# Patient Record
Sex: MALE | Race: WHITE | Employment: FULL TIME | URBAN - METROPOLITAN AREA
[De-identification: names, ages, dates, MRNs, and addresses within clinical notes are randomized per-mention and may not be internally consistent; named-entity substitution may affect disease eponyms.]

---

## 2019-01-24 ENCOUNTER — NURSE TRIAGE (OUTPATIENT)
Dept: NURSING | Facility: CLINIC | Age: 34
End: 2019-01-24

## 2019-01-24 ENCOUNTER — OFFICE VISIT (OUTPATIENT)
Dept: FAMILY MEDICINE | Facility: CLINIC | Age: 34
End: 2019-01-24

## 2019-01-24 ENCOUNTER — ANCILLARY PROCEDURE (OUTPATIENT)
Dept: GENERAL RADIOLOGY | Facility: CLINIC | Age: 34
End: 2019-01-24

## 2019-01-24 VITALS
OXYGEN SATURATION: 96 % | RESPIRATION RATE: 16 BRPM | WEIGHT: 269 LBS | SYSTOLIC BLOOD PRESSURE: 129 MMHG | DIASTOLIC BLOOD PRESSURE: 76 MMHG | TEMPERATURE: 99 F | HEART RATE: 96 BPM

## 2019-01-24 DIAGNOSIS — R10.32 ABDOMINAL PAIN, LEFT LOWER QUADRANT: Primary | ICD-10-CM

## 2019-01-24 DIAGNOSIS — K59.00 CONSTIPATION, UNSPECIFIED CONSTIPATION TYPE: ICD-10-CM

## 2019-01-24 LAB
ALBUMIN UR-MCNC: NEGATIVE MG/DL
APPEARANCE UR: CLEAR
BACTERIA #/AREA URNS HPF: ABNORMAL /HPF
BASOPHILS # BLD AUTO: 0 10E9/L (ref 0–0.2)
BASOPHILS NFR BLD AUTO: 0.3 %
BILIRUB UR QL STRIP: NEGATIVE
COLOR UR AUTO: YELLOW
DIFFERENTIAL METHOD BLD: NORMAL
EOSINOPHIL # BLD AUTO: 0.1 10E9/L (ref 0–0.7)
EOSINOPHIL NFR BLD AUTO: 1.2 %
ERYTHROCYTE [DISTWIDTH] IN BLOOD BY AUTOMATED COUNT: 12.2 % (ref 10–15)
GLUCOSE UR STRIP-MCNC: NEGATIVE MG/DL
HCT VFR BLD AUTO: 44.5 % (ref 40–53)
HGB BLD-MCNC: 15.5 G/DL (ref 13.3–17.7)
HGB UR QL STRIP: ABNORMAL
HYALINE CASTS #/AREA URNS LPF: ABNORMAL /LPF
KETONES UR STRIP-MCNC: NEGATIVE MG/DL
LEUKOCYTE ESTERASE UR QL STRIP: NEGATIVE
LYMPHOCYTES # BLD AUTO: 2 10E9/L (ref 0.8–5.3)
LYMPHOCYTES NFR BLD AUTO: 21.8 %
MCH RBC QN AUTO: 29.6 PG (ref 26.5–33)
MCHC RBC AUTO-ENTMCNC: 34.8 G/DL (ref 31.5–36.5)
MCV RBC AUTO: 85 FL (ref 78–100)
MONOCYTES # BLD AUTO: 0.7 10E9/L (ref 0–1.3)
MONOCYTES NFR BLD AUTO: 7.2 %
MUCOUS THREADS #/AREA URNS LPF: PRESENT /LPF
NEUTROPHILS # BLD AUTO: 6.3 10E9/L (ref 1.6–8.3)
NEUTROPHILS NFR BLD AUTO: 69.5 %
NITRATE UR QL: NEGATIVE
NON-SQ EPI CELLS #/AREA URNS LPF: ABNORMAL /LPF
PH UR STRIP: 6 PH (ref 5–7)
PLATELET # BLD AUTO: 284 10E9/L (ref 150–450)
RBC # BLD AUTO: 5.24 10E12/L (ref 4.4–5.9)
RBC #/AREA URNS AUTO: ABNORMAL /HPF
SOURCE: ABNORMAL
SP GR UR STRIP: >1.03 (ref 1–1.03)
UROBILINOGEN UR STRIP-ACNC: 0.2 EU/DL (ref 0.2–1)
WBC # BLD AUTO: 9.1 10E9/L (ref 4–11)
WBC #/AREA URNS AUTO: ABNORMAL /HPF

## 2019-01-24 PROCEDURE — 99203 OFFICE O/P NEW LOW 30 MIN: CPT | Performed by: FAMILY MEDICINE

## 2019-01-24 PROCEDURE — 85025 COMPLETE CBC W/AUTO DIFF WBC: CPT | Performed by: FAMILY MEDICINE

## 2019-01-24 PROCEDURE — 74019 RADEX ABDOMEN 2 VIEWS: CPT

## 2019-01-24 PROCEDURE — 36415 COLL VENOUS BLD VENIPUNCTURE: CPT | Performed by: FAMILY MEDICINE

## 2019-01-24 PROCEDURE — 87086 URINE CULTURE/COLONY COUNT: CPT | Performed by: FAMILY MEDICINE

## 2019-01-24 PROCEDURE — 81001 URINALYSIS AUTO W/SCOPE: CPT | Performed by: FAMILY MEDICINE

## 2019-01-24 SDOH — HEALTH STABILITY: MENTAL HEALTH: HOW OFTEN DO YOU HAVE A DRINK CONTAINING ALCOHOL?: NEVER

## 2019-01-24 ASSESSMENT — PAIN SCALES - GENERAL: PAINLEVEL: MILD PAIN (2)

## 2019-01-24 NOTE — PROGRESS NOTES
"  SUBJECTIVE:   Jarad Bridges is a 33 year old male who presents to clinic today for the following health issues:      Patient presents with:  Abdominal Pain: pt c/o abdominal pain near belly botton x 3 days, denies injuury, nausea orvomiting but states he may be constipated.      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   Some occasional spasms colicky pain  Some worse with moving certain way    Severity: mild   description: above   aggravating symptoms: above   relieving symptoms: not sure just a little better   nausea and vomiting: no  Diarrhea none - does have slight constipation nothing tried.   treatments tried: none    urinary symptoms:  none   flank pain:  none  fever or chills:  none  weight loss or constitutional symptoms: none  melena, hematochezia, or hematemesis: none  No testicular pain -   Problem list, Medication list, Allergies, and Medical/Social/Surgical histories reviewed in [facility] and updated as appropriate.      ROS:  General: negative for fever  Resp: negative for chest pain   CV: negative for chest pain  ABD: as above  : negative for dysuria  Neurologic:negative for Headache  Psych: denies any thoughts of harming self or others.     Constitutional, HEENT, cardiovascular, pulmonary, GI, , musculoskeletal, neuro, skin, endocrine and psych systems are negative, except as otherwise noted.    OBJECTIVE:  /76   Pulse 96   Temp 99  F (37.2  C) (Oral)   Resp 16   Wt 122 kg (269 lb)   SpO2 96%  "   General:   awake, alert, and cooperative.  NAD.   Head: Normocephalic, atraumatic.  Eyes: Conjunctiva clear, non icteric. WILMAN  Heart: Regular rate and rhythm. No murmur.  Lungs: Chest is clear; no wheezes or rales.  ABD: soft, mild left lower quadrant  tenderness to palpation , no rigidity, guarding or rebound , bowel sounds intact  RECTAL: declined/deferred  : bilaterally descended testes nontender. No swelling.  Normal appearing external genitalia. No hernias  No rashes no penile discharge.   Rectal exam: good rectal tone. Brown stool on examining finger. No hemorrhoids. No skin tags. No rectal masses.  Right testicle mass palpable  Neuro: Alert and oriented - normal speech.       Diagnostic Test Results:  Results for orders placed or performed in visit on 01/24/19 (from the past 24 hour(s))   CBC with platelets differential   Result Value Ref Range    WBC 9.1 4.0 - 11.0 10e9/L    RBC Count 5.24 4.4 - 5.9 10e12/L    Hemoglobin 15.5 13.3 - 17.7 g/dL    Hematocrit 44.5 40.0 - 53.0 %    MCV 85 78 - 100 fl    MCH 29.6 26.5 - 33.0 pg    MCHC 34.8 31.5 - 36.5 g/dL    RDW 12.2 10.0 - 15.0 %    Platelet Count 284 150 - 450 10e9/L    % Neutrophils 69.5 %    % Lymphocytes 21.8 %    % Monocytes 7.2 %    % Eosinophils 1.2 %    % Basophils 0.3 %    Absolute Neutrophil 6.3 1.6 - 8.3 10e9/L    Absolute Lymphocytes 2.0 0.8 - 5.3 10e9/L    Absolute Monocytes 0.7 0.0 - 1.3 10e9/L    Absolute Eosinophils 0.1 0.0 - 0.7 10e9/L    Absolute Basophils 0.0 0.0 - 0.2 10e9/L    Diff Method Automated Method    UA with Microscopic reflex to Culture   Result Value Ref Range    Color Urine Yellow     Appearance Urine Clear     Glucose Urine Negative NEG^Negative mg/dL    Bilirubin Urine Negative NEG^Negative    Ketones Urine Negative NEG^Negative mg/dL    Specific Gravity Urine >1.030 1.003 - 1.035    pH Urine 6.0 5.0 - 7.0 pH    Protein Albumin Urine Negative NEG^Negative mg/dL    Urobilinogen Urine 0.2 0.2 - 1.0 EU/dL    Nitrite Urine  Negative NEG^Negative    Blood Urine Trace (A) NEG^Negative    Leukocyte Esterase Urine Negative NEG^Negative    Source Midstream Urine     WBC Urine 0 - 5 OTO5^0 - 5 /HPF    RBC Urine O - 2 OTO2^O - 2 /HPF    Hyaline Casts 2-5 (A) OTO2^O - 2 /LPF    Squamous Epithelial /LPF Urine Few FEW^Few /LPF    Bacteria Urine Few (A) NEG^Negative /HPF    Mucous Urine Present (A) NEG^Negative /LPF   XR Abdomen 2 Views    Narrative    ABDOMEN TWO VIEWS    1/24/2019 4:39 PM     HISTORY: Abdominal pain, left lower quadrant    COMPARISON: None.    FINDINGS: No air-filled dilated loops of large or small bowel are  identified. No evidence of free air, pneumatosis, or portal venous  gas. Soft tissues and osseous structures are unremarkable.      Impression    IMPRESSION: Unremarkable.    DANAY KEENAN MD     ASSESSMENT:well appearing      ICD-10-CM    1. Abdominal pain, left lower quadrant R10.32 CBC with platelets differential     UA with Microscopic reflex to Culture     XR Abdomen 2 Views     Urine Culture Aerobic Bacterial   2. Constipation, unspecified constipation type K59.00          PLAN:   Differentials include: abdominal wall pain and constipation  Normal wbc, clinical suspicion on history and exam for diverticulitis or acute abdomen low at this time. He declines ER evaluation  He prefers to observe  Start miralax  Follow up primary care provider recommended   Alarm signs or symptoms discussed, if present recommend go to ER     Advised about symptoms which might herald more serious problems.    adverse reactions of medication discussed  Over the counter medications discussed  advised to come back in right away if with any worsening symptoms or if with no relief despite treatment plan  close follow-up recommended.  patient voiced understanding and had no further questions at this time.    Patient has testicle mass on right testicle - recommended ultrasound to rule out pathology however he declines  HE STATES HE'S HAD THIS  EVALUATED MULTIPLE TIMES IN THE PAST WITH UROLOGY AND HE WAS TOLD IT WAS BENIGN AND HE DID NOT WANT IMAGING BECAUSE HE DOES NOT THINK IT HAS CHANGED  Risks discussed. Patient voiced understanding. He feels very certain that he did not need additional testing.         Amara Garrett MD

## 2019-01-24 NOTE — PATIENT INSTRUCTIONS
Patient Education     Abdominal Pain    Abdominal pain is pain in the stomach or belly area. Everyone has this pain from time to time. In many cases it goes away on its own. But abdominal pain can sometimes be due to a serious problem, such as appendicitis. So it s important to know when to seek help.  Causes of abdominal pain  There are many possible causes of abdominal pain. Common causes in adults include:    Constipation, diarrhea, or gas    Stomach acid flowing back up into the esophagus (acid reflux or heartburn)    Severe acid reflux, called GERD (gastroesophageal reflux disease)    A sore in the lining of the stomach or small intestine (peptic ulcer)    Inflammation of the gallbladder, liver, or pancreas    Gallstones or kidney stones    Appendicitis     Intestinal blockage     An internal organ pushing through a muscle or other tissue (hernia)    Urinary tract infections    In women, menstrual cramps, fibroids, or endometriosis    Inflammation or infection of the intestines  Diagnosing the cause of abdominal pain  Your healthcare provider will do a physical exam help find the cause of your pain. If needed, tests will be ordered. Belly pain has many possible causes. So it can be hard to find the reason for your pain. Giving details about your pain can help. Tell your provider where and when you feel the pain, and what makes it better or worse. Also let your provider know if you have other symptoms such as:    Fever    Tiredness    Upset stomach (nausea)    Vomiting    Changes in bathroom habits  Treating abdominal pain  Some causes of pain need emergency medical treatment right away. These include appendicitis or a bowel blockage. Other problems can be treated with rest, fluids, or medicines. Your healthcare provider can give you specific instructions for treatment or self-care based on what is causing your pain.  If you have vomiting or diarrhea, sip water or other clear fluids. When you are ready to eat  solid foods again, start with small amounts of easy-to-digest, low-fat foods. These include apple sauce, toast, or crackers.   When to seek medical care  Call 911 or go to the hospital right away if you:    Can t pass stool and are vomiting    Are vomiting blood or have bloody diarrhea or black, tarry diarrhea    Have chest, neck, or shoulder pain    Feel like you might pass out    Have pain in your shoulder blades with nausea    Have sudden, severe belly pain    Have new, severe pain unlike any you have felt before    Have a belly that is rigid, hard, and tender to touch  Call your healthcare provider if you have:    Pain for more than 5 days    Bloating for more than 2 days    Diarrhea for more than 5 days    A fever of 100.4 F (38 C) or higher, or as directed by your healthcare provider    Pain that gets worse    Weight loss for no reason    Continued lack of appetite    Blood in your stool  How to prevent abdominal pain  Here are some tips to help prevent abdominal pain:    Eat smaller amounts of food at one time.    Avoid greasy, fried, or other high-fat foods.    Avoid foods that give you gas.    Exercise regularly.    Drink plenty of fluids.  To help prevent GERD symptoms:    Quit smoking.    Reduce alcohol and certain foods that increase stomach acid.    Avoid aspirin and over-the-counter pain and fever medicines (NSAIDS or nonsteroidal anti-inflammatory drugs), if possible    Lose extra weight.    Finish eating at least 2 hours before you go to bed or lie down.    Raise the head of your bed.  Date Last Reviewed: 7/1/2016 2000-2018 The "[employer]. [address] [place] [address], [place] [address]. All rights reserved. This information is not intended as a substitute for professional medical care. Always follow your healthcare professional's instructions.           Patient Education     Polyethylene Glycol 3350 Oral solution  What is this medicine?  POLYETHYLENE GLYCOL 3350 (katie ee ETH i harper; GLYE col)  powder is a laxative used to treat constipation. It increases the amount of water in the stool. Bowel movements become easier and more frequent.  This medicine may be used for other purposes; ask your health care provider or pharmacist if you have questions.  What should I tell my health care provider before I take this medicine?  They need to know if you have any of these conditions:    a history of blockage of the stomach or intestine    current abdomen distension or pain    difficulty swallowing    diverticulitis, ulcerative colitis, or other chronic bowel disease    phenylketonuria    an unusual or allergic reaction to polyethylene glycol, other medicines, dyes, or preservatives    pregnant or trying to get pregnant    breast-feeding  How should I use this medicine?  Take this medicine by mouth. The bottle has a measuring cap that is marked with a line. Pour the powder into the cap up to the marked line (the dose is about 1 heaping tablespoon). Add the powder in the cap to a full glass (4 to 8 ounces or 120 to 240 ml) of water, juice, soda, coffee or tea. Mix the powder well. Drink the solution. Take exactly as directed. Do not take your medicine more often than directed.  Talk to your pediatrician regarding the use of this medicine in children. Special care may be needed.  Overdosage: If you think you have taken too much of this medicine contact a poison control center or emergency room at once.  NOTE: This medicine is only for you. Do not share this medicine with others.  What if I miss a dose?  If you miss a dose, take it as soon as you can. If it is almost time for your next dose, take only that dose. Do not take double or extra doses.  What may interact with this medicine?  Interactions are not expected.  This list may not describe all possible interactions. Give your health care provider a list of all the medicines, herbs, non-prescription drugs, or dietary supplements you use. Also tell them if you smoke,  drink alcohol, or use illegal drugs. Some items may interact with your medicine.  What should I watch for while using this medicine?  Do not use for more than 2 weeks without advice from your doctor or health care professional. It can take 2 to 4 days to have a bowel movement and to experience improvement in constipation. See your health care professional for any changes in bowel habits, including constipation, that are severe or last longer than three weeks.  Always take this medicine with plenty of water.  What side effects may I notice from receiving this medicine?  Side effects that you should report to your doctor or health care professional as soon as possible:    diarrhea    difficulty breathing    itching of the skin, hives, or skin rash    severe bloating, pain, or distension of the stomach    vomiting  Side effects that usually do not require medical attention (report to your doctor or health care professional if they continue or are bothersome):    bloating or gas    lower abdominal discomfort or cramps    nausea  This list may not describe all possible side effects. Call your doctor for medical advice about side effects. You may report side effects to FDA at 1-732-FDA-9395.  Where should I keep my medicine?  Keep out of the reach of children.  Store between 15 and 30 degrees C (59 and 86 degrees F). Throw away any unused medicine after the expiration date.  NOTE:This sheet is a summary. It may not cover all possible information. If you have questions about this medicine, talk to your doctor, pharmacist, or health care provider. Copyright  2016 [employer]

## 2019-01-24 NOTE — TELEPHONE ENCOUNTER
"\"I have been having pain below the stomach.\" \"More on the left.\" Symptoms starting in past 2 days. \"Feel a little constipated.\"Last bowel movement yesterday 1/23/19 \"normal.\" Reporting feeling \"bloated.\"  Afebrile.    Per guidelines advised to be seen with in 4 hours. Patient had previously scheduled appointment for 345 pm today. Transferred to Central Scheduling to request earlier appointment.     Caller verbalized understanding. Denies further questions.    Lisa Emery RN  [facility]        Reason for Disposition    [1] MILD-MODERATE pain AND [2] constant AND [3] present > 2 hours    Additional Information    Negative: Shock suspected (e.g., cold/pale/clammy skin, too weak to stand, low BP, rapid pulse)    Negative: Difficult to awaken or acting confused  (e.g., disoriented, slurred speech)    Negative: Passed out (i.e., lost consciousness, collapsed and was not responding)    Negative: Sounds like a life-threatening emergency to the triager    Negative: Chest pain    Negative: Pain is mainly in upper abdomen  (if needed ask: \"is it mainly above the belly button?\")    Negative: Followed an abdomen (stomach) injury    Negative: [1] SEVERE pain (e.g., excruciating) AND [2] present > 1 hour    Negative: [1] SEVERE pain AND [2] age > 60    Negative: [1] Vomiting AND [2] contains red blood or black (\"coffee ground\") material  (Exception: few red streaks in vomit that only happened once)    Negative: Blood in bowel movements  (Exception: Blood on surface of BM with constipation)    Negative: Black or tarry bowel movements  (Exception: chronic-unchanged  black-grey bowel movements AND is taking iron pills or Pepto-bismol)    Negative: [1] Unable to urinate (or only a few drops) > 4 hours AND     [2] bladder feels very full (e.g., palpable bladder or strong urge to urinate)    Negative: [1] Pain in the scrotum or testicle AND [2] present > 1 hour    Negative: Patient sounds very sick or weak to the " triager    Protocols used: ABDOMINAL PAIN - MALE-ADULT-AH

## 2019-01-25 LAB
BACTERIA SPEC CULT: NORMAL
SPECIMEN SOURCE: NORMAL

## 2019-05-01 ENCOUNTER — OFFICE VISIT (OUTPATIENT)
Dept: OPTOMETRY | Facility: CLINIC | Age: 34
End: 2019-05-01
Payer: COMMERCIAL

## 2019-05-01 ENCOUNTER — OFFICE VISIT (OUTPATIENT)
Dept: FAMILY MEDICINE | Facility: CLINIC | Age: 34
End: 2019-05-01
Payer: COMMERCIAL

## 2019-05-01 VITALS
SYSTOLIC BLOOD PRESSURE: 130 MMHG | WEIGHT: 271 LBS | HEART RATE: 71 BPM | TEMPERATURE: 97.8 F | RESPIRATION RATE: 18 BRPM | OXYGEN SATURATION: 96 % | DIASTOLIC BLOOD PRESSURE: 74 MMHG

## 2019-05-01 DIAGNOSIS — T15.01XA FOREIGN BODY IN CORNEA, RIGHT EYE, INITIAL ENCOUNTER: Primary | ICD-10-CM

## 2019-05-01 DIAGNOSIS — T15.01XA FOREIGN BODY OF RIGHT CORNEA, INITIAL ENCOUNTER: Primary | ICD-10-CM

## 2019-05-01 PROCEDURE — 65222 REMOVE FOREIGN BODY FROM EYE: CPT | Mod: RT | Performed by: OPTOMETRIST

## 2019-05-01 PROCEDURE — 99202 OFFICE O/P NEW SF 15 MIN: CPT | Mod: 25 | Performed by: OPTOMETRIST

## 2019-05-01 PROCEDURE — 99213 OFFICE O/P EST LOW 20 MIN: CPT | Performed by: FAMILY MEDICINE

## 2019-05-01 RX ORDER — OFLOXACIN 3 MG/ML
1 SOLUTION/ DROPS OPHTHALMIC 4 TIMES DAILY
Qty: 1 BOTTLE | Refills: 0 | Status: SHIPPED | OUTPATIENT
Start: 2019-05-01 | End: 2019-05-06

## 2019-05-01 ASSESSMENT — SLIT LAMP EXAM - LIDS: COMMENTS: NORMAL

## 2019-05-01 ASSESSMENT — VISUAL ACUITY
OS_SC: 20/20-1
OD_SC: 20/20

## 2019-05-01 ASSESSMENT — PAIN SCALES - GENERAL: PAINLEVEL: NO PAIN (0)

## 2019-05-01 NOTE — NURSING NOTE
Chief Complaint   Patient presents with     Eye Problem     FB in eye -right eye - x 1 day - metal or paint       Initial /74   Pulse 71   Temp 97.8  F (36.6  C) (Oral)   Resp 18   Wt 271 lb (122.9 kg)   SpO2 96%  There is no height or weight on file to calculate BMI.  Medication Reconciliation: complete  Thuy Osei M.A.

## 2019-05-01 NOTE — PATIENT INSTRUCTIONS
Patient was advised of today's exam findings.  Vit C aids in healing, use prescription drops four times a day x 5 days   Return to clinic 1 week as needed     Zarina Estevez O.D.  [facility]   [address] [place], [place] [id number] [phone]

## 2019-05-01 NOTE — PROGRESS NOTES
SUBJECTIVE:   Jarad Bridges is a 33 year old male who presents to clinic today for the following   health issues:      Eye(s) Problem      Duration: FB in eye    Description:  Location: right  Pain: YES  Redness: YES  Discharge: no     Accompanying signs and symptoms: irritation    History (Trauma, foreign body exposure,): yes    Precipitating or alleviating factors (contact use): None    Therapies tried and outcome: tried flushing    Yesterday was wearing safety glasses on  And then when he got home   Was sanding   denies blurring of vision  Problem list, Medication list, Allergies, and Medical/Social/Surgical histories reviewed in [facility] and updated as appropriate.    ROS:  General: negative for fever  EYE: as above  No fevers or chills chest pain or shortness of breath     OBJECTIVE:  /74   Pulse 71   Temp 97.8  F (36.6  C) (Oral)   Resp 18   Wt 122.9 kg (271 lb)   SpO2 96%    General : Awake Alert not in any acute cardiorespiratory distress  Head:       Normocephalic Atraumatic  Eyes:    Pupils equally reactive to light and accomodation. Sclera not icteric. Extra occular muscles intact full and equal. No hyphema, no hypopyon, no ciliary flush. No eyelid swelling or periorbital cellulitis. Mild erythema of  right  conjunctiva.   Patient with likely metallic foreign body seen embedded in the limbus of the right eye cornea lateral   Neurologic: No cranial nerve deficits.   Psych: Appropriate mood and affect. Pleasant  Skin: patient undressed to level of his/her comfort. No visible concerning lesions.      ASSESSMENT:    ICD-10-CM    1. Foreign body in cornea, right eye, initial encounter T15.01XA            PLAN:   Patient referred to eye clinic for foreign body removal because embedded corneal foreign body.         Amara Garrett MD

## 2020-04-16 ENCOUNTER — HOSPITAL ENCOUNTER (OUTPATIENT)
Facility: CLINIC | Age: 35
Discharge: HOME | End: 2020-04-16
Attending: INTERNAL MEDICINE | Admitting: INTERNAL MEDICINE
Payer: COMMERCIAL

## 2021-12-02 ENCOUNTER — OFFICE VISIT (OUTPATIENT)
Dept: URGENT CARE | Facility: URGENT CARE | Age: 36
End: 2021-12-02

## 2021-12-02 VITALS
HEART RATE: 59 BPM | WEIGHT: 273.4 LBS | SYSTOLIC BLOOD PRESSURE: 126 MMHG | OXYGEN SATURATION: 97 % | TEMPERATURE: 97.6 F | DIASTOLIC BLOOD PRESSURE: 77 MMHG

## 2021-12-02 DIAGNOSIS — S61.210A LACERATION OF RIGHT INDEX FINGER WITHOUT FOREIGN BODY WITHOUT DAMAGE TO NAIL, INITIAL ENCOUNTER: Primary | ICD-10-CM

## 2021-12-02 PROCEDURE — 90715 TDAP VACCINE 7 YRS/> IM: CPT | Performed by: FAMILY MEDICINE

## 2021-12-02 PROCEDURE — 12001 RPR S/N/AX/GEN/TRNK 2.5CM/<: CPT | Performed by: FAMILY MEDICINE

## 2021-12-02 PROCEDURE — 90471 IMMUNIZATION ADMIN: CPT | Performed by: FAMILY MEDICINE

## 2021-12-02 RX ORDER — CEPHALEXIN 500 MG/1
500 CAPSULE ORAL 3 TIMES DAILY
Qty: 21 CAPSULE | Refills: 0 | Status: SHIPPED | OUTPATIENT
Start: 2021-12-02 | End: 2021-12-09

## 2021-12-02 NOTE — PATIENT INSTRUCTIONS
Suture removal 10-14 days    Patient Education     * Laceration (All Closures)  A laceration is a cut through the skin. This will usually require stitches (sutures) or staples if it is deep. Minor cuts may be treated with a tape closure ( Steri-Strips ) or Dermabond skin glue.    Home Care:  PAIN MEDICINE: You may use acetaminophen (Tylenol) 650-1000 mg every 6 hours or ibuprofen (Motrin, Advil) 600 mg every 6-8 hours with food to control pain, if you are able to take these medicines. [NOTE: If you have chronic liver or kidney disease or ever had a stomach ulcer or GI bleeding, talk with your doctor before using these medicines.]  EXTREMITY, FACE or TRUNK WOUNDS:    Keep the wound clean and dry. If a bandage was applied and it becomes wet or dirty, replace it. Otherwise, leave it in place for the first 24 hours.    If stitches or staples were used, clean the wound daily. Protect the wound from sunlight and tanning lamps.    After removing the bandage, wash the area with soap and water. Use a wet cotton swab (Q tip) to loosen and remove any blood or crust that forms.    After cleaning, apply a thin layer of Polysporin or Bacitracin ointment. This will keep the wound clean and make it easier to remove the stitches or staples. Reapply a fresh bandage.    You may remove the bandage to shower as usual after the first 24 hours, but do not soak the area in water (no swimming) until the stitches or staples are removed.    If Steri-Strips were used, keep the area clean and dry. If it becomes wet, blot it dry with a towel. It is okay to take a brief shower, but avoid scrubbing the area.    If Dermabond skin adhesive was used, do not scratch, rub or pick at the adhesive film. Do not place tape directly over the film. Do not apply liquid, ointment or creams to the wound while the film is in place. Do not clean the wound with peroxide and do not apply ointments. Avoid activities that cause heavy sweating until the film has  fallen off. Protect the wound from prolonged exposure to sunlight or tanning lamps. You may shower as usual but do not soak the wound in water (no baths or swimming). The film will fall off by itself in 5-10 days.  SCALP WOUNDS: During the first two days, you may carefully rinse your hair in the shower to remove blood, glass or dirt particles. After two days, you may shower and shampoo your hair normally. Do not soak your scalp in the tub or go swimming until the stitches or staples have been removed.  MOUTH WOUNDS: Eat soft foods to reduce pain. If the cut is inside of your mouth, clean by rinsing after each meal and at bedtime with a mixture of equal parts water and Hydrogen Peroxide (do not swallow!). Or, you can use a cotton swab to directly apply Hydrogen Peroxide onto the cut.  After the wound is done healing, use sunscreen over the area whenever exposed for the next 6 minths to avoid a darker scar.  Follow Up:  Most skin wounds heal within ten days. Mouth and facial wounds heal within five days. However, even with proper treatment, a wound infection may sometimes occur. Therefore, you should check the wound daily for signs of infection listed below.  Stitches should be removed from the face within five days; stitches and staples should be removed from other parts of the body within 7-10 days. Unless you are told to come back to the emergency room, you may have your doctor or urgent care remove the stitches. If dissolving stitches were used in the mouth, these will fall out or dissolve without the need for removal. If tape closures ( Steri-Strips ) were used, remove them yourself if they have not fallen off after 7 days. If Dermabond skin glue was used, the film will fall off by itself in 5-10 days.  Get Prompt Medical Attention  if any of the following occur:    Increasing pain in the wound    Redness, swelling or pus coming from the wound    Fever over 101 F (38.3 C) oral    If stitches or staples come apart  or fall out or if Steri-Strips fall off before seven days    If the wound edges re-open    Bleeding not controlled by direct pressure  For informational purposes only. Not to replace the advice of your health care provider.  Copyright   2018 [employer]. All rights reserved.

## 2021-12-02 NOTE — PROGRESS NOTES
Chief complaint: right hand    wc  Employer: [employer]    Was with a  and the cord pulled it on the hand and it tore through the glove and over his knuckle   Bleeding stopped immediately but there is a laceration gaping over the knuckle       Last tetanus booster within 10 years: no     No Known Allergies    No past medical history on file.    No current outpatient medications on file prior to visit.  No current facility-administered medications on file prior to visit.        ROS:  GEN: no fever or chills  CARDIAC: no chest pain or shortness of breath  SKIN: as above  Musculoskeletal: as above      OBJECTIVE:  Blood pressure 126/77, pulse 59, temperature 97.6  F (36.4  C), temperature source Tympanic, weight 124 kg (273 lb 6.4 oz), SpO2 97 %.     The patient appears today in no acute distress.  Laceration LOCATION: dorsum of the PIP joint of right index finger   Size of laceration: 0.5 centimeters  Characteristics of the laceration: clean, extends into subcutaneous fat and straight  Tendon function, sensation intact. No weakness. Good pulses. Good range of motion  Cap refill intact.  Wound clean. No Foreign body noted.     Assessment:      ICD-10-CM    1. Laceration of right index finger without foreign body without damage to nail, initial encounter  S61.210A TDAP VACCINE (Adacel, Boostrix)  [[id number]]     cephALEXin (KEFLEX) 500 MG capsule     REPAIR SUPERFICIAL, WOUND BODY < =2.5CM       PLAN:  Oral consent received.  Patient aware of risks which include but are not limited to infection, bleeding, iatrogenic injury. Alternative treatment plan was also discussed  Copious irrigation with normal saline done and hibiclens  Prepped with betadine  Sterile fields maintained at all times   locally injected with Lidocaine 1% plain  At the laceration  ,  good anesthesia achieved.    Wound cleaned with saline. No FBs.    Laceration was closed using 1interrupted sutures  Bacitracin dressing.  Patient tolerated  procedure well.      Over a joint - prescribed with keflex prophylaxis  Finger splint for 2 days then wean off to avoid stiffness  See AVS  Suture removal in 10-14 days   Advised to watch for signs or symptoms of infection. If with any concerns of infection advised to come in immediately to be reassessed. Routine wound care discussed.     Amara Garrett MD

## 2021-12-15 ENCOUNTER — ALLIED HEALTH/NURSE VISIT (OUTPATIENT)
Dept: NURSING | Facility: CLINIC | Age: 36
End: 2021-12-15
Payer: COMMERCIAL

## 2021-12-15 DIAGNOSIS — Z48.02 VISIT FOR SUTURE REMOVAL: Primary | ICD-10-CM

## 2021-12-15 PROCEDURE — 99207 PR NO CHARGE NURSE ONLY: CPT

## 2021-12-15 NOTE — PROGRESS NOTES
All sutures were removed without difficulty and tolerated well by the pateint. Patient was given signs and symptoms to look for to return to clinic.  They were given an opportunity to ask questions and had none.  They were also given after care instructions from the reference sections of their chart.  The patient was given the [phone] number to call if they need nursing support and stated they needed no further support.  Thank you. Layla Brown R.N.

## 2024-05-04 NOTE — PROGRESS NOTES
Chief Complaint   Patient presents with     Eye Problem Right Eye     FB, Possible metel in eye        Do you wear contact lenses? No         Michelle Cuellar Optometric Assistant      Review of Symptoms    EYES: See HPI  CONSTITUTIONAL: patient reports feeling healthy          Medical, surgical and family histories reviewed and updated 5/1/2019.         OBJECTIVE: See Ophthalmology exam  Removed metal foreign body from right cornea near limbus with slit lamp and spud.      Diego brush was used to remove perilimbal rust ring. Mild bleeding- he tolerated procedure well       ASSESSMENT:    ICD-10-CM    1. Foreign body of right cornea, initial encounter T15.01XA ofloxacin (OCUFLOX) 0.3 % ophthalmic solution     REMVOVAL FB EYE, CORNEAL W SLIT LAMP      PLAN:    Patient Instructions   Patient was advised of today's exam findings.  Vit C aids in healing, use prescription drops four times a day x 5 days   Return to clinic 1 week as needed     Zarina Estevez O.D.  [facility]   [address] [place], [place] [id number] [phone]       
6